# Patient Record
Sex: FEMALE | Race: WHITE | Employment: FULL TIME | URBAN - METROPOLITAN AREA
[De-identification: names, ages, dates, MRNs, and addresses within clinical notes are randomized per-mention and may not be internally consistent; named-entity substitution may affect disease eponyms.]

---

## 2018-03-31 ENCOUNTER — HOSPITAL ENCOUNTER (EMERGENCY)
Age: 23
Discharge: HOME | End: 2018-03-31
Attending: EMERGENCY MEDICINE
Payer: MEDICAID

## 2018-03-31 VITALS
TEMPERATURE: 98 F | HEART RATE: 80 BPM | DIASTOLIC BLOOD PRESSURE: 70 MMHG | SYSTOLIC BLOOD PRESSURE: 121 MMHG | RESPIRATION RATE: 16 BRPM | BODY MASS INDEX: 29.23 KG/M2 | HEIGHT: 63 IN | WEIGHT: 165 LBS | OXYGEN SATURATION: 99 %

## 2018-03-31 DIAGNOSIS — Z3A.25 25 WEEKS GESTATION OF PREGNANCY: ICD-10-CM

## 2018-03-31 DIAGNOSIS — N30.00 ACUTE CYSTITIS WITHOUT HEMATURIA: ICD-10-CM

## 2018-03-31 DIAGNOSIS — K80.20 GALLSTONES: Primary | ICD-10-CM

## 2018-03-31 LAB
ALBUMIN SERPL-MCNC: 3.2 G/DL (ref 3.5–5)
ALBUMIN/GLOB SERPL: 0.7 {RATIO} (ref 1.2–3.5)
ALP SERPL-CCNC: 132 U/L (ref 50–136)
ALT SERPL-CCNC: 67 U/L (ref 12–65)
ANION GAP SERPL CALC-SCNC: 8 MMOL/L (ref 7–16)
AST SERPL-CCNC: 88 U/L (ref 15–37)
BACTERIA URNS QL MICRO: ABNORMAL /HPF
BASOPHILS # BLD: 0 K/UL (ref 0–0.2)
BASOPHILS NFR BLD: 0 % (ref 0–2)
BILIRUB SERPL-MCNC: 0.8 MG/DL (ref 0.2–1.1)
BUN SERPL-MCNC: 7 MG/DL (ref 6–23)
CALCIUM SERPL-MCNC: 9.3 MG/DL (ref 8.3–10.4)
CASTS URNS QL MICRO: ABNORMAL /LPF
CHLORIDE SERPL-SCNC: 108 MMOL/L (ref 98–107)
CO2 SERPL-SCNC: 25 MMOL/L (ref 21–32)
CREAT SERPL-MCNC: 0.51 MG/DL (ref 0.6–1)
DIFFERENTIAL METHOD BLD: ABNORMAL
EOSINOPHIL # BLD: 0.1 K/UL (ref 0–0.8)
EOSINOPHIL NFR BLD: 1 % (ref 0.5–7.8)
EPI CELLS #/AREA URNS HPF: ABNORMAL /HPF
ERYTHROCYTE [DISTWIDTH] IN BLOOD BY AUTOMATED COUNT: 12.2 % (ref 11.9–14.6)
GLOBULIN SER CALC-MCNC: 4.4 G/DL (ref 2.3–3.5)
GLUCOSE SERPL-MCNC: 110 MG/DL (ref 65–100)
HCT VFR BLD AUTO: 37.7 % (ref 35.8–46.3)
HGB BLD-MCNC: 13.1 G/DL (ref 11.7–15.4)
IMM GRANULOCYTES # BLD: 0 K/UL (ref 0–0.5)
IMM GRANULOCYTES NFR BLD AUTO: 0 % (ref 0–5)
LIPASE SERPL-CCNC: 149 U/L (ref 73–393)
LYMPHOCYTES # BLD: 1.4 K/UL (ref 0.5–4.6)
LYMPHOCYTES NFR BLD: 13 % (ref 13–44)
MCH RBC QN AUTO: 32.8 PG (ref 26.1–32.9)
MCHC RBC AUTO-ENTMCNC: 34.7 G/DL (ref 31.4–35)
MCV RBC AUTO: 94.5 FL (ref 79.6–97.8)
MONOCYTES # BLD: 0.7 K/UL (ref 0.1–1.3)
MONOCYTES NFR BLD: 6 % (ref 4–12)
NEUTS SEG # BLD: 8.8 K/UL (ref 1.7–8.2)
NEUTS SEG NFR BLD: 80 % (ref 43–78)
PLATELET # BLD AUTO: 246 K/UL (ref 150–450)
PMV BLD AUTO: 9.4 FL (ref 10.8–14.1)
POTASSIUM SERPL-SCNC: 3.6 MMOL/L (ref 3.5–5.1)
PROT SERPL-MCNC: 7.6 G/DL (ref 6.3–8.2)
RBC # BLD AUTO: 3.99 M/UL (ref 4.05–5.25)
RBC #/AREA URNS HPF: ABNORMAL /HPF
SODIUM SERPL-SCNC: 141 MMOL/L (ref 136–145)
WBC # BLD AUTO: 11.1 K/UL (ref 4.5–13.5)
WBC URNS QL MICRO: >100 /HPF

## 2018-03-31 PROCEDURE — 80053 COMPREHEN METABOLIC PANEL: CPT | Performed by: EMERGENCY MEDICINE

## 2018-03-31 PROCEDURE — 83690 ASSAY OF LIPASE: CPT | Performed by: EMERGENCY MEDICINE

## 2018-03-31 PROCEDURE — 85025 COMPLETE CBC W/AUTO DIFF WBC: CPT | Performed by: EMERGENCY MEDICINE

## 2018-03-31 PROCEDURE — 74011250637 HC RX REV CODE- 250/637: Performed by: EMERGENCY MEDICINE

## 2018-03-31 PROCEDURE — 96365 THER/PROPH/DIAG IV INF INIT: CPT | Performed by: EMERGENCY MEDICINE

## 2018-03-31 PROCEDURE — 81003 URINALYSIS AUTO W/O SCOPE: CPT | Performed by: EMERGENCY MEDICINE

## 2018-03-31 PROCEDURE — 99284 EMERGENCY DEPT VISIT MOD MDM: CPT | Performed by: EMERGENCY MEDICINE

## 2018-03-31 PROCEDURE — 81015 MICROSCOPIC EXAM OF URINE: CPT | Performed by: EMERGENCY MEDICINE

## 2018-03-31 PROCEDURE — 74011000258 HC RX REV CODE- 258: Performed by: EMERGENCY MEDICINE

## 2018-03-31 PROCEDURE — 74011250636 HC RX REV CODE- 250/636: Performed by: EMERGENCY MEDICINE

## 2018-03-31 RX ORDER — FAMOTIDINE 20 MG/1
20 TABLET, FILM COATED ORAL
Status: COMPLETED | OUTPATIENT
Start: 2018-03-31 | End: 2018-03-31

## 2018-03-31 RX ORDER — HYDROCODONE BITARTRATE AND ACETAMINOPHEN 5; 325 MG/1; MG/1
1 TABLET ORAL ONCE
Status: COMPLETED | OUTPATIENT
Start: 2018-03-31 | End: 2018-03-31

## 2018-03-31 RX ORDER — AMOXICILLIN 250 MG/1
250 CAPSULE ORAL 3 TIMES DAILY
Qty: 9 CAP | Refills: 0 | Status: SHIPPED | OUTPATIENT
Start: 2018-03-31

## 2018-03-31 RX ORDER — ONDANSETRON 4 MG/1
4 TABLET, FILM COATED ORAL
Qty: 12 TAB | Refills: 0 | Status: SHIPPED | OUTPATIENT
Start: 2018-03-31

## 2018-03-31 RX ORDER — ONDANSETRON 4 MG/1
4 TABLET, ORALLY DISINTEGRATING ORAL ONCE
Status: COMPLETED | OUTPATIENT
Start: 2018-03-31 | End: 2018-03-31

## 2018-03-31 RX ORDER — HYDROCODONE BITARTRATE AND ACETAMINOPHEN 5; 325 MG/1; MG/1
1 TABLET ORAL
Qty: 12 TAB | Refills: 0 | Status: SHIPPED | OUTPATIENT
Start: 2018-03-31

## 2018-03-31 RX ADMIN — ONDANSETRON 4 MG: 4 TABLET, ORALLY DISINTEGRATING ORAL at 19:49

## 2018-03-31 RX ADMIN — HYDROCODONE BITARTRATE AND ACETAMINOPHEN 1 TABLET: 5; 325 TABLET ORAL at 19:49

## 2018-03-31 RX ADMIN — FAMOTIDINE 20 MG: 20 TABLET, FILM COATED ORAL at 19:49

## 2018-03-31 RX ADMIN — CEFTRIAXONE SODIUM 1 G: 1 INJECTION, POWDER, FOR SOLUTION INTRAMUSCULAR; INTRAVENOUS at 21:48

## 2018-03-31 NOTE — ED TRIAGE NOTES
PT is 25 weeks pregnant. Complains of upper right sided abdominal pain and \"between my shoulder blades\". States went to [facility] and was told she has gallstones. States pain is not getting any better. States they were supposed to call her with possible appt for surgery but states no one ever called.

## 2018-04-01 NOTE — ED PROVIDER NOTES
HPI Comments: Patient states onset of right sided abdomina pain, epigastric pain a couple of weeks ago. Intermittent. Worse this week. Nausea with no vomiting. No diarrhea. Chills. No fever. Increased urinary frequency. Little dysuria. Today ate tacos at 11 am. Increased pain at noon. G 3 P 2. 25 weeks pregnant. [facility] July 12. [address]. Seen this week. Had [address] at [facility] - gallstones. No vaginal bleeding. Fetal movement present. No bleeding or fluid leak. Patient is a 23 y.o. female presenting with abdominal pain. The history is provided by the patient, the spouse and medical records. Abdominal Pain    This is a new problem. The current episode started more than 1 week ago. The problem occurs every several days. The problem has not changed since onset. The pain is located in the RUQ and epigastric region. The quality of the pain is sharp. The pain is moderate. Associated symptoms include nausea, dysuria, frequency and back pain. Pertinent negatives include no fever, no diarrhea, no vomiting and no constipation. Nothing worsens the pain. The pain is relieved by nothing. Past workup includes ultrasound. Her past medical history is significant for gallstones. History reviewed. No pertinent past medical history. History reviewed. No pertinent surgical history. History reviewed. No pertinent family history. Social History     Social History    Marital status: SINGLE     Spouse name: N/A    Number of children: N/A    Years of education: N/A     Occupational History    Not on file. Social History Main Topics    Smoking status: Former Smoker    Smokeless tobacco: Not on file    Alcohol use No    Drug use: No    Sexual activity: Not on file     Other Topics Concern    Not on file     Social History Narrative         ALLERGIES: Review of patient's allergies indicates no known allergies. Review of Systems   Constitutional: Positive for chills.  Negative for appetite change and fever.   HENT: Negative. Eyes: Negative. Respiratory: Negative. Cardiovascular: Negative. Gastrointestinal: Positive for abdominal pain and nausea. Negative for constipation, diarrhea and vomiting. Genitourinary: Positive for dysuria and frequency. Negative for menstrual problem, pelvic pain, vaginal bleeding and vaginal discharge. Musculoskeletal: Positive for back pain. Skin: Negative. Neurological: Negative. Hematological: Negative. Psychiatric/Behavioral: Negative. Vitals:    03/31/18 1804   BP: 119/74   Pulse: 86   Resp: 16   Temp: 97.9 °F (36.6 °C)   SpO2: 99%   Weight: 74.8 kg (165 lb)   Height: 5' 3\" (1.6 m)            Physical Exam   Constitutional: She is oriented to person, place, and time. She appears well-developed and well-nourished. HENT:   Head: Normocephalic and atraumatic. Right Ear: External ear normal.   Left Ear: External ear normal.   Mouth/Throat: Oropharynx is clear and moist.   Eyes: Conjunctivae and EOM are normal. Pupils are equal, round, and reactive to light. No scleral icterus. Neck: Normal range of motion. Neck supple. No JVD present. Cardiovascular: Normal rate, regular rhythm, normal heart sounds and intact distal pulses. Pulmonary/Chest: Effort normal and breath sounds normal.   Abdominal: Soft. Bowel sounds are normal. She exhibits no mass. There is tenderness (RUQ tenderness. gravid uterus. fetal movement. ). There is no rebound and no guarding. No CVA mass or tenderness. Musculoskeletal: Normal range of motion. She exhibits no edema or tenderness. Neurological: She is alert and oriented to person, place, and time. Skin: Skin is warm and dry. Psychiatric: She has a normal mood and affect. Her behavior is normal.   Nursing note and vitals reviewed.        MDM      ED Course       Procedures      Abdominal pain  25 week IUP  Labs reviewed  Urine > 100 WBC, bacteria  norco 5 po, Zofran 4 po  Improved  Rocephin 1 gram IV  Rx Norco 5, Zofran 4, Amoxicillin 250 tid x 3 days  US from [facility] - gallstones with no wall thickening or dilated ducts  Discussed with Dr. Remigio Franklin - low fat diet, office follow up  Results and instructions discussed with patient and fiance  Follow up with OB

## 2024-02-23 NOTE — ED NOTES
GENERAL ANESTHESIA/IV SEDATION/SPINAL POST SURGERY INSTRUCTIONS    1. Rest at home (not necessarily in bed) for 24 hours following anesthesia. You may feel sleepy for the next 24 hours. Do not drive an automobile, operate heavy machinery, or make important decisions.    2. Your diet should start with clear liquids increasing to a light diet on the day of surgery. You may then resume your normal diet. Do not drink alcoholic beverages for 24 hours. If nausea occurs, limit diet to clear liquids (soda, tea, broth, Jell-O). If nausea continues for more than 12 hours, call your doctor.    3. The doctor prescribed Tramadol, Ibuprofen, and Tylenol for pain. Take as directed. If nothing was prescribed, you may take a non-prescription non- aspirin containing pain medicine such as Tylenol, Advil, etc. If pain is not relieved, call your doctor.    4. Call your doctor if there is excessive:   A. Bleeding or drainage   B. Fever-10 1°F or higher   C. Swelling or redness    5. We strongly recommend a responsible adult to be with you for 24 hours following surgery. This recommendation is for your protection and safety.    6. Avoid smoking for 24 hours following general anesthesia.    7. Drink plenty of fluids. If you are unable to urinate by 6PM or a feeling of pressure occurs, call your surgeon and or go to the nearest emergency center.    8. If you any questions please call your Surgeon. If you cannot reach your doctor, call [facility] Emergency Department at [phone].    9. Other instructions:    1.  Take Sitz baths as needed throughout the day.  No soaps or salts needed.    2.  Use Tylenol 1000 mg every 8 hours and ibuprofen 600 mg every 8 hours for pain.  We typically also prescribe a narcotic pain medication to use in addition to Tylenol and ibuprofen if the pain is very severe.    3.  Use Miralax (over-the-counter laxative) daily while taking narcotic pain medication to avoid constipation and  I have reviewed discharge instructions with the patient. The patient verbalized understanding. Patient left ED via Discharge Method: ambulatory to Home with family. Opportunity for questions and clarification provided. Patient given 3 scripts. To continue your aftercare when you leave the hospital, you may receive an automated call from our care team to check in on how you are doing. This is a free service and part of our promise to provide the best care and service to meet your aftercare needs.  If you have questions, or wish to unsubscribe from this service please call [phone]. Thank you for Choosing our [facility] Emergency Department. straining.    4.  Expect bleeding with BMs for the first week and sometimes longer.  If it is very large volume, please call your surgeon.    5.  If you were prescribed a topical cream, please apply this 3 times a day.    6.  You may have dissolvable gauze in the rectum.  This will pass with your first BM and can be flushed down the toilet.    7.  No heavy lifting more than 15 pounds until seen in the office.  No exercise or anal intercourse until that time also.    8.  If any questions, please do not call the hospital.  Call [facility] at [phone] instead, 24/7.          Want to recognize a nurse?  The [facility] Award® is a way to recognize nurses who provided an outstanding level of care to you during your visit.   Submit a nomination using any method below.     [email or web address]    ?????????? ?? ???? ?????????/IV ???????/???????? ???? ????????    1. ????????? ? ???? (?? ?????????? ? ???????) ?? 24 ???? ???? ???????????. ???? ?? ?? ????????? ??????? ???? ?????????? 24 ????. ?? ????????? ?????????, ?? ???????? ? ????? ?????? ? ?? ???????? ????? ???????.    2. ?????? ????? ?????? ?? ??????? ? ?????? ????????, ???? ?? ??????? ?? ???? ????? ? ???? ?? ??????????. ???? ???? ?????? ?? ??????????? ?????????? ?? ?????. ?? ????? ????????? ??????? ? ??????????? ?? 24 ????. ??? ?? ????? ??????, ?????????? ??????? ?? ?????? ???????? (????, ???, ??????, ????). ??? ???????? ???????? ?????? ?? 12 ????, ??????? ?? ?? ????? ?????.    3. ??????? ???????? ????????, ????????? ? ??????? ?? ?????. ??????? ?????? ??????????. ??? ???? ?? ? ??????????, ?????? ?? ??????? ????????? ?? ????? ??? ???????, ????? ?? ??????? ???????, ???? Tylenol, Advil ? ??. ??? ??????? ?? ?? ???????, ??????? ?? ?? ????? ?????.    4. ??????? ?? ?? ????? ?????, ??? ??? ??????????:  ?. ??????? ??? ??????  B. ??????-10 1°F ??? ??-??????  C. ???????? ??? ???????????    5. ?????? ???????????? ????????? ????????? ?? ???? ? ??? 24 ???? ????  ??????????. ???? ????????? ? ?? ?????? ?????? ? ???????????.    6. ?????????? ???????? ? ??????????? ?? 24 ???? ???? ???? ?????????.    7. ????? ????? ????????. ??? ?? ?????? ?? ????????? ?? 18 ???? ??? ?? ????? ??????? ?? ??????, ??????? ?? ?? ????? ?????? ??? ??????? ?? ???-??????? ?????? ?? ?????? ?????.    8. ??? ????? ???????, ????, ??????? ?? ?? ????? ??????. ??? ?? ?????? ?? ?? ???????? ? ????? ?????, ??????? ?? ?? ???????? ????????? ?? [facility] ?? [phone].    9. ????? ??????????:    1. ??????? ?????? ???? ?????? ??????? ???? ????? ???. ?? ?? ?????????? ?????? ??? ????.    2. ??????????? Tylenol 1000 mg ?? ????? 8 ???? ? ????????? 600 mg ?? ????? 8 ???? ?? ?????. ?????????? ???? ???? ??????????? ?????????? ????????? ?? ?????, ????? ?? ?????????? ? ?????????? ??? ??????? ? ?????????, ??? ??????? ? ????? ?????.    3. ??????????? Miralax (?????????? ??? ???????) ????? ???, ?????? ???????? ????????? ?? ?????????? ?????, ?? ?? ????????? ????? ? ??????????.    4. ????????? ??????? ? BMs ???? ??????? ???????, ? ???????? ? ??-?????. ??? ?????? ? ????? ?????, ????, ??????? ?? ?? ????? ??????.    5. ??? ?? ? ????????? ??????? ????, ????, ?????????? ?? 3 ???? ?? ???.    6. ???? ?? ????? ?????????? ????? ? ???????. ???? ?? ??????? ? ?????? ?? BM ? ???? ?? ?? ???????? ? ??????????.    7. ??? ??????? ?? ??????? ??? 15 ??????, ?????? ?? ?? ????? ? ?????. ?? ???? ?????? ???? ???? ?????????? ??? ?????? ???????.    8. ??? ????? ???????, ????, ?? ?? ????????? ? ?????????. ??????? ?? ?? [facility] ?? [phone] ?????? ????, 24/7.